# Patient Record
Sex: FEMALE | URBAN - METROPOLITAN AREA
[De-identification: names, ages, dates, MRNs, and addresses within clinical notes are randomized per-mention and may not be internally consistent; named-entity substitution may affect disease eponyms.]

---

## 2019-12-30 ENCOUNTER — RECORDS - HEALTHEAST (OUTPATIENT)
Dept: ADMINISTRATIVE | Facility: CLINIC | Age: 34
End: 2019-12-30

## 2021-06-04 NOTE — TELEPHONE ENCOUNTER
[facility] [phone]  Referring Provider: Darlyn  DX:GDM    Ref./rec. Were received on 12/30 @ 11:29 in the DM folder.